# Patient Record
Sex: FEMALE | Race: WHITE
[De-identification: names, ages, dates, MRNs, and addresses within clinical notes are randomized per-mention and may not be internally consistent; named-entity substitution may affect disease eponyms.]

---

## 2019-07-14 ENCOUNTER — HOSPITAL ENCOUNTER (EMERGENCY)
Dept: HOSPITAL 74 - JERFT | Age: 24
Discharge: HOME | End: 2019-07-14
Payer: COMMERCIAL

## 2019-07-14 VITALS — BODY MASS INDEX: 29.7 KG/M2

## 2019-07-14 VITALS — TEMPERATURE: 98.2 F | HEART RATE: 77 BPM | DIASTOLIC BLOOD PRESSURE: 77 MMHG | SYSTOLIC BLOOD PRESSURE: 125 MMHG

## 2019-07-14 DIAGNOSIS — J06.9: Primary | ICD-10-CM

## 2019-07-14 DIAGNOSIS — R04.0: ICD-10-CM

## 2019-07-14 NOTE — PDOC
History of Present Illness





- General


Chief Complaint: Nasal Bleeding


Stated Complaint: NOSE BLEEDING


Time Seen by Provider: 07/14/19 12:56


History Source: Patient


Exam Limitations: No Limitations





- History of Present Illness


Initial Comments: 





07/14/19 13:28





HISTORY OF PRESENT ILLNESS: 23-year-old woman with past medical history of 

migraines who presents emergency department for reevaluation of upper 

respiratory illness with 3 episodes of epistaxis. Patient reports been taking 

over-the-counter remedies for her upper respiratory infection with minimal 

relief of symptoms over the past 5 days. Patient reports she went swimming in 

her pool yesterday and then went back into the air-conditioning and had an 

episode of epistaxis. Spontaneously resolved. Patient then had an episode later 

on yesterday lasting approximately 15 minutes with resolution with direct 

pressure and ice. Patient had another episode this morning was a slow trickle 

of blood from the nose. She denies any digital trauma.





No recent travel or sick contacts. 


PAST MEDICAL HISTORY: see HPI


SURGICAL HISTORY: Denies


ALLERGIES: No known drug allergies





REVIEW OF SYSTEMS


General/Constitutional: Denies fever or chills. Denies weakness, weight change.


HEENT: see HPI


Cardiovascular: Denies chest pain or shortness of breath.


Respiratory: Denies cough, wheezing, or hemoptysis.


Gastrointestinal: Denies nausea, vomiting, diarrhea or constipation. Denies 

rectal bleeding.


Genitourinary: Denies dysuria, frequency, or change in urination.


Musculoskeletal: Denies joint or muscle swelling or pain. Denies neck or back 

pain.


Skin and breasts: Denies rash or easy bruising.


Neurologic: Denies headache, vertigo, loss of consciousness, or loss of 

sensation.


Psychiatric: Denies depression or anxiety.


Endocrine: Denies increased thirst. Denies abnormal weight change.


Hematologic/Lymphatic: Denies anemia, easy bleeding, or history of blood clots.


Allergic/Immunologic: Denies hives or skin allergy. Denies latex allergy.











PHYSICAL EXAM


General Appearance: Well-appearing, appropriately dressed.  No apparent distress

, no intoxication.


HEENT: EOMI, PERRLA. TM retractions present bilaterally. No effusions are 

present. Inflamed turbinates noted in the anterior nasal cavity. Bleeding is 

likely from Kiesselbach's plexus. Airways are patent. Oropharynx with 

cobblestoning present in the posterior aspect. Mucous present along the a 

aspect of the oropharynx.


Neck: Supple.  Trachea midline. No tenderness, rigidity, carotid bruit, stridor

, lymphadenopathy, or thyromegaly. 


Respiratory/Chest: Lungs CTAB.  No shortness of breath, chest tenderness, 

respiratory distress, accessory muscle use. No crackles, rales, rhonchi, stridor

, wheezing, dullness


Cardiovascular: RRR. S1, S2.  No JVD, murmur, bradycardia, tachycardia.


Vascular Pulses: Dorsalis-Pedis (R): 2+, Dorsalis-Pedis (L): 2+


Neurologic: CNs II-XII intact. Fully oriented, alert.  Appropriate mood/affect. 

Motor strength 5/5.  No appreciable EOM palsy, facial droop or sensory deficit.


07/14/19 13:30








Past History





- Past Medical History


Allergies/Adverse Reactions: 


 Allergies











Allergy/AdvReac Type Severity Reaction Status Date / Time


 


No Known Allergies Allergy   Verified 07/14/19 12:47











Home Medications: 


Ambulatory Orders





Oxymetazoline HCl [Afrin] 1 spray NS BID #1 spray 07/14/19 








Asthma: Yes


COPD: No





- Suicide/Smoking/Psychosocial Hx


Smoking History: Never smoked





*Physical Exam





- Vital Signs


 Last Vital Signs











Temp Pulse Resp BP Pulse Ox


 


 98.2 F   77   18   125/77   99 


 


 07/14/19 12:44  07/14/19 12:44  07/14/19 12:44  07/14/19 12:44  07/14/19 12:44














Medical Decision Making





- Medical Decision Making





07/14/19 13:27


A/P:


23-year-old woman with 4 days of upper respiratory illness with epistaxis 3 

episodes over 2 days





Bleeding present in the anterior nose or Kesselbach's plexus


Nasal passages patent. No active bleeding at this time





I will discharge patient home with a referral for ENT and recommendations to 

take Afrin and remain well-hydrated. Patient is continue previously prescribed 

over-the-counter medication for upper respiratory illness





I discussed the physical exam findings, ancillary test results and final 

diagnoses with the patient. I answered all of the patient's questions. The 

patient was satisfied with the care received and felt comfortable with the 

discharge plan and treatment plan. The patient will call their primary care 

physician within 24 hours to arrange follow-up and will return to the Emergency 

Department with any new, persistent or worsening symptoms. 


07/14/19 13:28








*DC/Admit/Observation/Transfer


Diagnosis at time of Disposition: 


 Epistaxis not due to trauma








- Discharge Dispostion


Disposition: HOME


Condition at time of disposition: Fair


Decision to Admit order: No





- Prescriptions


Prescriptions: 


Oxymetazoline HCl [Afrin] 1 spray NS BID #1 spray





- Referrals


Referrals: 


Juancarlos Henry MD [Primary Care Provider] - 


Amos Nixon MD [Staff Physician] - 





- Patient Instructions


Additional Instructions: 


Rest, drink lots of fluids: Teas, water, soups, Pedialyte


Saltwater gargles


Steamy showers/seem to face break up mucus


Avoid contact with others until fevers and cough resolved


Lots of handwashing and good hygiene





Use Ocean McGrath nasal spray for your nose.


Use Afrin as directed. Do not use for more than 3 days.





Continue over-the-counter medications for symptomatic relief


Tylenol or Motrin for fever and pain





Followup with ENT in one to 2 days as needed


Return to emergency department for worsened symptoms, fevers, dehydration





- Post Discharge Activity